# Patient Record
Sex: FEMALE | Race: WHITE | NOT HISPANIC OR LATINO | URBAN - METROPOLITAN AREA
[De-identification: names, ages, dates, MRNs, and addresses within clinical notes are randomized per-mention and may not be internally consistent; named-entity substitution may affect disease eponyms.]

---

## 2017-01-29 ENCOUNTER — AMBULATORY - HEALTHEAST (OUTPATIENT)
Dept: PHYSICAL MEDICINE AND REHAB | Facility: CLINIC | Age: 52
End: 2017-01-29

## 2017-06-26 ENCOUNTER — HOSPITAL ENCOUNTER (OUTPATIENT)
Dept: PHYSICAL MEDICINE AND REHAB | Facility: CLINIC | Age: 52
Discharge: HOME | End: 2017-06-26
Attending: PHYSICAL MEDICINE & REHABILITATION

## 2017-06-26 DIAGNOSIS — M99.03 LUMBAR REGION SOMATIC DYSFUNCTION: ICD-10-CM

## 2017-06-26 DIAGNOSIS — M99.00 HEAD REGION SOMATIC DYSFUNCTION: ICD-10-CM

## 2017-06-26 DIAGNOSIS — M99.02 THORACIC REGION SOMATIC DYSFUNCTION: ICD-10-CM

## 2017-06-26 DIAGNOSIS — M99.01 CERVICAL SOMATIC DYSFUNCTION: ICD-10-CM

## 2017-06-26 DIAGNOSIS — M99.08 RIB CAGE REGION SOMATIC DYSFUNCTION: ICD-10-CM

## 2017-06-26 DIAGNOSIS — M99.04 SACRAL REGION SOMATIC DYSFUNCTION: ICD-10-CM

## 2017-06-26 DIAGNOSIS — M47.812 CERVICAL FACET SYNDROME: ICD-10-CM

## 2017-06-26 DIAGNOSIS — M79.18 MYOFASCIAL PAIN: ICD-10-CM

## 2017-06-26 DIAGNOSIS — M54.2 NECK PAIN: ICD-10-CM

## 2017-06-26 DIAGNOSIS — M99.05 SOMATIC DYSFUNCTION OF PELVIS REGION: ICD-10-CM

## 2017-06-29 ENCOUNTER — AMBULATORY - HEALTHEAST (OUTPATIENT)
Dept: PHYSICAL MEDICINE AND REHAB | Facility: CLINIC | Age: 52
End: 2017-06-29

## 2017-08-16 ENCOUNTER — RECORDS - HEALTHEAST (OUTPATIENT)
Dept: ADMINISTRATIVE | Facility: OTHER | Age: 52
End: 2017-08-16

## 2021-06-11 NOTE — PROGRESS NOTES
Assessment:   Tir Collier is a 52 y.o. y.o. female with past medical history significant for hyperlipidemia, anxiety/depression who presents today for follow-up regarding left neck pain of approximately 2-3 months duration.  She likely has a myofascial pain component with an element of cervical facet syndrome.  She has previously received significant relief with osteopathic manipulation and she is here today for that treatment modality.  She has multiple areas of somatic dysfunction as listed below. She denies any radicular symptoms or any red flag symptoms.         Plan:     A shared decision making plan was used.  The patient's values and choices were respected.  The following represents what was discussed and decided upon by the physician and the patient.      1.  DIAGNOSTIC TESTS:  No further diagnostic tests are necessary at this time.    2.  PHYSICAL THERAPY: The patient would like to return to physical therapy to review her home exercise program.  She wishes to go to an outside facility.  An order was provided.  She understands she will be responsible for scheduling physical therapy given that it is outside of the [facility] system.  She does not know the name of the program, but knows the location and plans to take the order there.  3.  MEDICATIONS:  No changes to the medications today.    4.  INTERVENTIONS:  Osteopathic manipulation was performed to 7 areas today.  The patient tolerated the treatment well and noted relief immediately afterwards..    5.  PATIENT EDUCATION:    - The patient was advised to rest today and drink plenty of water.  Normal activities can be resumed as tolerated tomorrow.    - The patient was told that soreness following the treatment is normal and should improve within a few days.  If the soreness is concerning, the clinic should be notified so further instructions can be given.  -Patient does have a sit to stand workstation.  She is encouraged to use it, and take frequent  breaks from sitting.  -Is encouraged to continue doing her stretching exercises.  6.  FOLLOW-UP: The patient is asked to follow-up in approximately 2-4 weeks.  If she has any questions or concerns before that time she is encouraged to call the clinic..     Subjective:     Tri Collier is a 52 y.o. female who presents today for follow-up regarding return of left-sided neck pain.  The patient was last seen in our clinic on February 12, 2016.  At that time she was also having neck pain and thoracic spine pain.  She reports that she did quite well after that treatment up until about 2-3 months ago.  She switched jobs and her new job is more sedentary and she thinks that this has contributed to her pain.  She also went on a recent road trip for her son's wedding and she thinks a long car ride also help to aggravate things.  Pain is located along the left side of her neck.  The pain can radiate around to the anterior neck muscles that she denies any radiation of pain going down the left arm.  She denies any numbness tingling or weakness in the left arm.  She has not had any headaches from the neck pain.  No significant symptoms on the right side.  She is rating her pain at a 4 out of 10.  At best it is a 2 out of 10.  At worst it is a 6 out of 10.  Pain is worse with turning her head.  She has been taking Excedrin and this does help somewhat.  She is not taking any prescription medications for the pain.  He is here today for further osteopathic manipulation, she reports that this provided significant relief.    Past medical history is reviewed and is unchanged in the interim.    Family history is reviewed and is unchanged in the interim.    Review of Systems:  Negative for numbness/tingling, weakness, bowel/bladder dysfunction, foot drop, headache, dizziness, nausea/vomiting, blurred vision, balance changes.     Objective:   CONSTITUTIONAL:  Vital signs as above.  No acute distress.  The patient is well nourished and  well groomed.    PSYCHIATRIC:  The patient is awake, alert, oriented to person, place and time.  The patient is answering questions appropriately with clear speech.  Normal affect.  SKIN:  Skin over the face, posterior torso, bilateral upper and lower extremities is clean, dry, intact without rashes.  MUSCULOSKELETAL:  Gait is non-antalgic.  The patient is able to transfer independently.  She has significantly hypertonic and tender bilateral upper trapezius muscles.  Patient has 5/5 strength of bilateral shoulder abductors, elbow flexors/extensors, wrist extensors, finger flexors/abductors.  Spurling's test does reproduce neck pain but no radicular symptoms going down the left arm.  She has restricted range of motion of the cervical spine with flexion, extension, side bending, rotation.  She reports pain with these motions.  There is asymmetry of sidebending and rotation comparing side to side.  Neuro: 2/4 biceps, brachioradialis, triceps reflexes bilaterally.  Negative Gonzales's bilaterally.  Sensation to light touch is intact in all of the digits of both upper extremities.    OSTEOPATHIC STRUCTURAL EXAM:  Negative standing flexion test.  Lumbar spine: L4-5 flexed, rotated/side bend left  Sacrum: Left on left forward sacral torsion  Innominate: Anterior/inferior right, posterior/superior left  Thoracic spine: T4-6 flexed, rotated/side bent left  Rib cage: First rib elevated on the left.  Decreased motion over ribs 2 through 5 on the left.  Cervical spine: C7 flexed, rotated/side bent right, C2 flexed, rotated/side bent right  Head/OA joint: Side bent left/rotated right    OMT:  TREATMENTS IN PARENTHESES  Lumbar spine: L4-5 flexed, rotated/side bend left (Muscle energy, patient in lateral recumbent)  Sacrum: Left on left forward sacral torsion (Muscle energy, patient in lateral recumbent)  Innominate: Anterior/inferior right, posterior/superior left (Muscle energy with the patient supine).  Thoracic spine: T4-6  flexed, rotated/side bent left (HVLA, patient supine followed by muscle energy with patient seated)  Rib cage: First rib elevated on the left.  (Myofascial release, patient supine) decreased motion over ribs 2 through 5 on the left.  (Myofascial release, patient in the lateral recumbent position).  Cervical spine: C7 flexed, rotated/side bent right, C2 flexed, rotated/side bent right (Muscle energy with the patient supine).  Head/OA joint: Side bent left/rotated right (Muscle energy with the patient supine).